# Patient Record
Sex: FEMALE | Race: WHITE | NOT HISPANIC OR LATINO | Employment: FULL TIME | URBAN - METROPOLITAN AREA
[De-identification: names, ages, dates, MRNs, and addresses within clinical notes are randomized per-mention and may not be internally consistent; named-entity substitution may affect disease eponyms.]

---

## 2018-08-13 ENCOUNTER — TELEPHONE (OUTPATIENT)
Dept: ENDOCRINOLOGY | Facility: HOSPITAL | Age: 63
End: 2018-08-13

## 2018-08-13 DIAGNOSIS — E04.9 GOITER: ICD-10-CM

## 2018-08-13 DIAGNOSIS — E04.1 THYROID NODULE: Primary | ICD-10-CM

## 2018-08-13 NOTE — TELEPHONE ENCOUNTER
Marry Coon from [facility] called  Pt is there for a Thyroid U/S, but her order shows her PCP's name as the pt name  Order shows Dx E01 4 & is for nodule & goiter, copy to Constanza Ma  Needs a new order faxed over (Fax #[phone])  Had Dr Kellen Gee submit new order and faxed it over to them

## 2018-09-04 ENCOUNTER — DOCUMENTATION (OUTPATIENT)
Dept: ENDOCRINOLOGY | Facility: HOSPITAL | Age: 63
End: 2018-09-04

## 2018-09-04 ENCOUNTER — TELEPHONE (OUTPATIENT)
Dept: ENDOCRINOLOGY | Facility: HOSPITAL | Age: 63
End: 2018-09-04

## 2018-09-04 NOTE — PROGRESS NOTES
Documentation of a thyroid ultrasound done on 08/13/2018 at [facility]   The right lobe of the thyroid measures 5 1 x 1 4 x 1 8 cm  The left lobe of the thyroid measures 4 7 x 1 3 x 1 3 cm  There is 1 nodule in the right upper pole measuring 1 2 cm which previously was 1 cm in 2016  The left lobe has 3 thyroid nodules but, 1 measuring 0 8 cm in the upper pole unchanged  A 2nd 1 in the lower pole measuring 0 5 cm which is new, and a 3rd 1 in the lower pole measuring 7 mm which is also new  There is a 0 5 cm isthmus nodule that is new  The largest nodule has not changed in size  There are new subcentimeter non worrisome nodules

## 2018-12-10 ENCOUNTER — OFFICE VISIT (OUTPATIENT)
Dept: ENDOCRINOLOGY | Facility: HOSPITAL | Age: 63
End: 2018-12-10
Payer: COMMERCIAL

## 2018-12-10 VITALS
HEART RATE: 94 BPM | HEIGHT: 64 IN | BODY MASS INDEX: 25.51 KG/M2 | SYSTOLIC BLOOD PRESSURE: 146 MMHG | DIASTOLIC BLOOD PRESSURE: 80 MMHG | WEIGHT: 149.4 LBS

## 2018-12-10 DIAGNOSIS — E04.1 THYROID NODULE: ICD-10-CM

## 2018-12-10 DIAGNOSIS — E04.2 GOITER, NONTOXIC, MULTINODULAR: Primary | ICD-10-CM

## 2018-12-10 PROCEDURE — 99203 OFFICE O/P NEW LOW 30 MIN: CPT | Performed by: INTERNAL MEDICINE

## 2018-12-10 RX ORDER — TAMOXIFEN CITRATE 20 MG/1
20 TABLET ORAL DAILY
Refills: 1 | COMMUNITY
Start: 2018-10-06 | End: 2021-12-21 | Stop reason: ALTCHOICE

## 2018-12-10 RX ORDER — AMPICILLIN TRIHYDRATE 250 MG
500 CAPSULE ORAL DAILY
COMMUNITY
End: 2021-12-21 | Stop reason: ALTCHOICE

## 2018-12-10 RX ORDER — MELATONIN
1000 DAILY
COMMUNITY
End: 2021-12-21 | Stop reason: ALTCHOICE

## 2018-12-10 RX ORDER — LOSARTAN POTASSIUM 50 MG/1
50 TABLET ORAL DAILY
COMMUNITY
Start: 2018-12-05

## 2018-12-10 NOTE — LETTER
December 10, 2018     Brisa Ho MD  [facility] [address] [place] [address]    Patient: Adry Parisi   YOB: 1955   Date of Visit: 12/10/2018       Dear Dr Ambrosio Gutierrez: Thank you for referring Adry Parisi to me for evaluation  Below are my notes for this consultation  If you have questions, please do not hesitate to call me  I look forward to following your patient along with you  Sincerely,        Rosetta Hale MD        CC: No Recipients  Rosetta Hale MD  12/10/2018 11:21 AM  Sign at close encounter  12/10/2018    Assessment/Plan      Diagnoses and all orders for this visit:    Goiter, nontoxic, multinodular  -     TSH, 3rd generation Lab Collect; Future  -     Thyroid Antibodies Panel Lab Collect; Future  -     T4, free Lab Collect; Future    Thyroid nodule  -     TSH, 3rd generation Lab Collect; Future  -     Thyroid Antibodies Panel Lab Collect; Future  -     T4, free Lab Collect; Future    Other orders  -     NOVOLOG 100 UNIT/ML injection; USE WITH INSULIN PUMP  -     losartan (COZAAR) 50 mg tablet; Take 50 mg by mouth daily  -     tamoxifen (NOLVADEX) 20 mg tablet; Take 20 mg by mouth daily  -     cholecalciferol (VITAMIN D3) 1,000 units tablet; Take 1,000 Units by mouth daily  -     LUTEIN PO; Take by mouth daily  -     Cinnamon 500 MG capsule; Take 500 mg by mouth daily        Assessment/Plan:  1  Nontoxic multinodular goiter  I do not have her most recent thyroid function tests  They were reportedly done in August with her primary care physician's blood work  We will try to get these results if possible  Her most recent thyroid ultrasound shows no significant change in thyroid nodule sizes other than some new small nodules which are non worrisome  For now, observation is the treatment of choice  I have asked her to follow up in 1 year with preceding TSH, free T4, and thyroid antibody panel        CC: Thyroid Consult    History of Present Illness     HPI: Sameul Flax Vanessa Song is a 70y o  year old female with history of nontoxic multinodular goiter  Thyroid nodules were noted over 10 years ago on routine physical by PCP  No biopsy needed as subcentimeter in size  She is on no thyroid medicine at this point  She denies heat or cold intolerance, palpitation, depression or anxiety, insomnia or fatigue  She has some diarrhea occasionally especially with certain foods  She denies constipation  She has some hand tremors on the right  She still has some dry skin and hair loss, but no brittle nails  Weight has decreased 5 lb in a year and a half  She denies diplopia  She has no compressive thyroid symptoms or difficulties with swallowing  She has no history of head or neck irradiation in the past     Review of Systems   Constitutional: Positive for unexpected weight change  Negative for fatigue  5 lbs weight loss since September 2017  HENT: Positive for tinnitus  Negative for hearing loss and trouble swallowing  Occasional tinnitus  No XRT to head or neck in the past    Eyes: Negative for visual disturbance  Occasional blurry vision  No diplopia  Wears glasses  Sees eye doctor every 6 months for glaucoma with increased IOP in right eye  Respiratory: Negative for chest tightness and shortness of breath  Cardiovascular: Negative for chest pain, palpitations and leg swelling  Gastrointestinal: Positive for diarrhea  Negative for abdominal pain, constipation and nausea  Occasional diarrhea, certain foods  Endocrine: Negative for cold intolerance and heat intolerance  Musculoskeletal: Positive for arthralgias  Negative for back pain  Tender and pain lateral right foot and medial 1st mop right  Has bilateral shoulder pain  Has a partially torn right rotator cuff  Skin: Negative for rash and wound  Has some dry skin  No brittle nails  Still hair loss  Neurological: Positive for tremors   Negative for dizziness, light-headedness, numbness and headaches  Has right hand tremors  Psychiatric/Behavioral: Negative for dysphoric mood and sleep disturbance  The patient is not nervous/anxious  Historical Information   Past Medical History:   Diagnosis Date    Family history of breast cancer     Hyperlipidemia     Hypertension     Type 2 diabetes mellitus ([address] )      Past Surgical History:   Procedure Laterality Date    BREAST LUMPECTOMY Left     reprtedly noncancerous    KNEE ARTHROTOMY Right     rebuilt knee cap    TONSILLECTOMY AND ADENOIDECTOMY      TOTAL ABDOMINAL HYSTERECTOMY W/ BILATERAL SALPINGOOPHORECTOMY      TUBAL LIGATION Bilateral      Social History   History   Alcohol Use No     History   Drug Use No     History   Smoking Status    Never Smoker   Smokeless Tobacco    Never Used     Family History:   Family History   Problem Relation Age of Onset    Hypertension Mother     Dementia Mother     Lung cancer Father         metastatic to brain    Diabetes type II Sister     Breast cancer Sister     Diabetes type II Brother     No Known Problems Brother     No Known Problems Daughter     No Known Problems Son        Meds/Allergies   Current Outpatient Prescriptions   Medication Sig Dispense Refill    cholecalciferol (VITAMIN D3) 1,000 units tablet Take 1,000 Units by mouth daily      Cinnamon 500 MG capsule Take 500 mg by mouth daily      losartan (COZAAR) 50 mg tablet Take 50 mg by mouth daily      LUTEIN PO Take by mouth daily      NOVOLOG 100 UNIT/ML injection USE WITH INSULIN PUMP  1    tamoxifen (NOLVADEX) 20 mg tablet Take 20 mg by mouth daily  1     No current facility-administered medications for this visit  Allergies   Allergen Reactions    Contrast [Iodinated Diagnostic Agents] Other (See Comments)     Passed out   Statins Diarrhea       Objective   Vitals: Blood pressure 146/80, pulse 94, height 5' 4" (1 626 m), weight 67 8 kg (149 lb 6 4 oz)    Invasive Devices No matching active lines, drains, or airways          Physical Exam   Constitutional: She is oriented to person, place, and time  She appears well-developed and well-nourished  HENT:   Head: Normocephalic and atraumatic  Eyes: Pupils are equal, round, and reactive to light  Conjunctivae and EOM are normal    No lid lag, stare, proptosis, or periorbital edema  Neck: Normal range of motion  Neck supple  No thyromegaly present  Thyroid irregular in feel without discrete nodules palpable  No bruits over the thyroid gland or carotids  Cardiovascular: Normal rate, regular rhythm and normal heart sounds  No murmur heard  Pulmonary/Chest: Effort normal and breath sounds normal  She has no wheezes  Abdominal: Soft  Bowel sounds are normal  There is no tenderness  Musculoskeletal: Normal range of motion  She exhibits no edema or deformity  Slight tremor of the outstretched hands on the right  No spinous process tenderness  No CVA tenderness  Lymphadenopathy:     She has no cervical adenopathy  Neurological: She is alert and oriented to person, place, and time  She has normal reflexes  Skin: Skin is warm and dry  No rash noted  Vitals reviewed  The history was obtained from the review of the chart and from the patient  Lab Results:    I have no recent thyroid blood work  Thyroid ultrasound done at [facility] on 08/13/2018 showed a right lobe of the thyroid measuring 5 1 x 1 4 x 1 8 cm in the left lobe of the thyroid measuring 4 7 x 1 3 x 1 3 cm  There is a 1 2 cm nodule in the right lobe of the thyroid 2 mm larger in size  It is solid and hypoechoic  There are 3 nodules in the left lobe of the thyroid all less than a cm in size and 2 of them are new  There is a stable subcentimeter isthmus nodule  No results found for this or any previous visit (from the past 05461 hour(s))        Future Appointments  Date Time Provider Nicanor Galo   12/20/2019 11:40 AM Edwin Cole MD [facility]

## 2018-12-10 NOTE — PATIENT INSTRUCTIONS
We'll keep searching for thyroid blood work  Thyroid ultrasound shows stable thyroid nodules  Observation is the treatment of choice  Follow up  In 1 year with blood work

## 2018-12-10 NOTE — PROGRESS NOTES
12/10/2018    Assessment/Plan      Diagnoses and all orders for this visit:    Goiter, nontoxic, multinodular  -     TSH, 3rd generation Lab Collect; Future  -     Thyroid Antibodies Panel Lab Collect; Future  -     T4, free Lab Collect; Future    Thyroid nodule  -     TSH, 3rd generation Lab Collect; Future  -     Thyroid Antibodies Panel Lab Collect; Future  -     T4, free Lab Collect; Future    Other orders  -     NOVOLOG 100 UNIT/ML injection; USE WITH INSULIN PUMP  -     losartan (COZAAR) 50 mg tablet; Take 50 mg by mouth daily  -     tamoxifen (NOLVADEX) 20 mg tablet; Take 20 mg by mouth daily  -     cholecalciferol (VITAMIN D3) 1,000 units tablet; Take 1,000 Units by mouth daily  -     LUTEIN PO; Take by mouth daily  -     Cinnamon 500 MG capsule; Take 500 mg by mouth daily        Assessment/Plan:  1  Nontoxic multinodular goiter  I do not have her most recent thyroid function tests  They were reportedly done in August with her primary care physician's blood work  We will try to get these results if possible  Her most recent thyroid ultrasound shows no significant change in thyroid nodule sizes other than some new small nodules which are non worrisome  For now, observation is the treatment of choice  I have asked her to follow up in 1 year with preceding TSH, free T4, and thyroid antibody panel  CC: Thyroid Consult    History of Present Illness     HPI: Kali García is a 70y o  year old female with history of nontoxic multinodular goiter  Thyroid nodules were noted over 10 years ago on routine physical by PCP  No biopsy needed as subcentimeter in size  She is on no thyroid medicine at this point  She denies heat or cold intolerance, palpitation, depression or anxiety, insomnia or fatigue  She has some diarrhea occasionally especially with certain foods  She denies constipation  She has some hand tremors on the right  She still has some dry skin and hair loss, but no brittle nails  Weight has decreased 5 lb in a year and a half  She denies diplopia  She has no compressive thyroid symptoms or difficulties with swallowing  She has no history of head or neck irradiation in the past     Review of Systems   Constitutional: Positive for unexpected weight change  Negative for fatigue  5 lbs weight loss since September 2017  HENT: Positive for tinnitus  Negative for hearing loss and trouble swallowing  Occasional tinnitus  No XRT to head or neck in the past    Eyes: Negative for visual disturbance  Occasional blurry vision  No diplopia  Wears glasses  Sees eye doctor every 6 months for glaucoma with increased IOP in right eye  Respiratory: Negative for chest tightness and shortness of breath  Cardiovascular: Negative for chest pain, palpitations and leg swelling  Gastrointestinal: Positive for diarrhea  Negative for abdominal pain, constipation and nausea  Occasional diarrhea, certain foods  Endocrine: Negative for cold intolerance and heat intolerance  Musculoskeletal: Positive for arthralgias  Negative for back pain  Tender and pain lateral right foot and medial 1st mop right  Has bilateral shoulder pain  Has a partially torn right rotator cuff  Skin: Negative for rash and wound  Has some dry skin  No brittle nails  Still hair loss  Neurological: Positive for tremors  Negative for dizziness, light-headedness, numbness and headaches  Has right hand tremors  Psychiatric/Behavioral: Negative for dysphoric mood and sleep disturbance  The patient is not nervous/anxious          Historical Information   Past Medical History:   Diagnosis Date    Family history of breast cancer     Hyperlipidemia     Hypertension     Type 2 diabetes mellitus ([address] )      Past Surgical History:   Procedure Laterality Date    BREAST LUMPECTOMY Left     reprtedly noncancerous    KNEE ARTHROTOMY Right     rebuilt knee cap    TONSILLECTOMY AND ADENOIDECTOMY      TOTAL ABDOMINAL HYSTERECTOMY W/ BILATERAL SALPINGOOPHORECTOMY      TUBAL LIGATION Bilateral      Social History   History   Alcohol Use No     History   Drug Use No     History   Smoking Status    Never Smoker   Smokeless Tobacco    Never Used     Family History:   Family History   Problem Relation Age of Onset    Hypertension Mother     Dementia Mother     Lung cancer Father         metastatic to brain    Diabetes type II Sister     Breast cancer Sister     Diabetes type II Brother     No Known Problems Brother     No Known Problems Daughter     No Known Problems Son        Meds/Allergies   Current Outpatient Prescriptions   Medication Sig Dispense Refill    cholecalciferol (VITAMIN D3) 1,000 units tablet Take 1,000 Units by mouth daily      Cinnamon 500 MG capsule Take 500 mg by mouth daily      losartan (COZAAR) 50 mg tablet Take 50 mg by mouth daily      LUTEIN PO Take by mouth daily      NOVOLOG 100 UNIT/ML injection USE WITH INSULIN PUMP  1    tamoxifen (NOLVADEX) 20 mg tablet Take 20 mg by mouth daily  1     No current facility-administered medications for this visit  Allergies   Allergen Reactions    Contrast [Iodinated Diagnostic Agents] Other (See Comments)     Passed out   Statins Diarrhea       Objective   Vitals: Blood pressure 146/80, pulse 94, height 5' 4" (1 626 m), weight 67 8 kg (149 lb 6 4 oz)  Invasive Devices          No matching active lines, drains, or airways          Physical Exam   Constitutional: She is oriented to person, place, and time  She appears well-developed and well-nourished  HENT:   Head: Normocephalic and atraumatic  Eyes: Pupils are equal, round, and reactive to light  Conjunctivae and EOM are normal    No lid lag, stare, proptosis, or periorbital edema  Neck: Normal range of motion  Neck supple  No thyromegaly present  Thyroid irregular in feel without discrete nodules palpable    No bruits over the thyroid gland or carotids  Cardiovascular: Normal rate, regular rhythm and normal heart sounds  No murmur heard  Pulmonary/Chest: Effort normal and breath sounds normal  She has no wheezes  Abdominal: Soft  Bowel sounds are normal  There is no tenderness  Musculoskeletal: Normal range of motion  She exhibits no edema or deformity  Slight tremor of the outstretched hands on the right  No spinous process tenderness  No CVA tenderness  Lymphadenopathy:     She has no cervical adenopathy  Neurological: She is alert and oriented to person, place, and time  She has normal reflexes  Skin: Skin is warm and dry  No rash noted  Vitals reviewed  The history was obtained from the review of the chart and from the patient  Lab Results:    I have no recent thyroid blood work  Thyroid ultrasound done at [facility] on 08/13/2018 showed a right lobe of the thyroid measuring 5 1 x 1 4 x 1 8 cm in the left lobe of the thyroid measuring 4 7 x 1 3 x 1 3 cm  There is a 1 2 cm nodule in the right lobe of the thyroid 2 mm larger in size  It is solid and hypoechoic  There are 3 nodules in the left lobe of the thyroid all less than a cm in size and 2 of them are new  There is a stable subcentimeter isthmus nodule  No results found for this or any previous visit (from the past 88094 hour(s))        Future Appointments  Date Time Provider Nicanor Fordei   12/20/2019 11:40 AM Floridalma Odell MD [facility]

## 2019-12-20 ENCOUNTER — OFFICE VISIT (OUTPATIENT)
Dept: ENDOCRINOLOGY | Facility: HOSPITAL | Age: 64
End: 2019-12-20
Payer: MEDICARE

## 2019-12-20 VITALS
WEIGHT: 143.6 LBS | HEIGHT: 64 IN | HEART RATE: 116 BPM | SYSTOLIC BLOOD PRESSURE: 140 MMHG | BODY MASS INDEX: 24.52 KG/M2 | DIASTOLIC BLOOD PRESSURE: 70 MMHG

## 2019-12-20 DIAGNOSIS — E04.1 THYROID NODULE: ICD-10-CM

## 2019-12-20 DIAGNOSIS — E04.2 GOITER, NONTOXIC, MULTINODULAR: Primary | ICD-10-CM

## 2019-12-20 PROCEDURE — 99213 OFFICE O/P EST LOW 20 MIN: CPT | Performed by: INTERNAL MEDICINE

## 2019-12-20 NOTE — PATIENT INSTRUCTIONS
The thyroid blood work is normal   We'll continue to follow over time  Follow up in 1 year with blood work and thyroid ultrasound
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
## 2020-10-11 LAB
T4 FREE SERPL-MCNC: 1.2 NG/DL (ref 0.8–1.8)
TSH SERPL-ACNC: 1.4 MIU/L (ref 0.4–4.5)

## 2020-11-02 ENCOUNTER — HOSPITAL ENCOUNTER (OUTPATIENT)
Dept: ULTRASOUND IMAGING | Facility: HOSPITAL | Age: 65
Discharge: HOME | End: 2020-11-02
Attending: INTERNAL MEDICINE
Payer: MEDICARE

## 2020-11-02 DIAGNOSIS — E04.1 THYROID NODULE: ICD-10-CM

## 2020-11-02 DIAGNOSIS — E04.2 GOITER, NONTOXIC, MULTINODULAR: ICD-10-CM

## 2020-11-02 PROCEDURE — 76536 US EXAM OF HEAD AND NECK: CPT

## 2020-12-17 ENCOUNTER — TELEMEDICINE (OUTPATIENT)
Dept: ENDOCRINOLOGY | Facility: HOSPITAL | Age: 65
End: 2020-12-17
Payer: MEDICARE

## 2020-12-17 DIAGNOSIS — E04.2 GOITER, NONTOXIC, MULTINODULAR: Primary | ICD-10-CM

## 2020-12-17 DIAGNOSIS — E04.1 THYROID NODULE: ICD-10-CM

## 2020-12-17 PROCEDURE — 99214 OFFICE O/P EST MOD 30 MIN: CPT | Performed by: INTERNAL MEDICINE

## 2021-03-10 DIAGNOSIS — Z23 ENCOUNTER FOR IMMUNIZATION: ICD-10-CM

## 2021-10-11 ENCOUNTER — HOSPITAL ENCOUNTER (OUTPATIENT)
Dept: ULTRASOUND IMAGING | Facility: HOSPITAL | Age: 66
Discharge: HOME | End: 2021-10-11
Attending: INTERNAL MEDICINE
Payer: MEDICARE

## 2021-10-11 ENCOUNTER — APPOINTMENT (OUTPATIENT)
Dept: LAB | Facility: HOSPITAL | Age: 66
End: 2021-10-11
Attending: INTERNAL MEDICINE
Payer: MEDICARE

## 2021-10-11 DIAGNOSIS — E04.2 GOITER, NONTOXIC, MULTINODULAR: ICD-10-CM

## 2021-10-11 DIAGNOSIS — E04.1 THYROID NODULE: ICD-10-CM

## 2021-10-11 LAB
T4 FREE SERPL-MCNC: 1.18 NG/DL (ref 0.76–1.46)
TSH SERPL DL<=0.05 MIU/L-ACNC: 1.18 UIU/ML (ref 0.36–3.74)

## 2021-10-11 PROCEDURE — 84443 ASSAY THYROID STIM HORMONE: CPT

## 2021-10-11 PROCEDURE — 36415 COLL VENOUS BLD VENIPUNCTURE: CPT

## 2021-10-11 PROCEDURE — 84439 ASSAY OF FREE THYROXINE: CPT

## 2021-10-11 PROCEDURE — 76536 US EXAM OF HEAD AND NECK: CPT

## 2021-10-18 ENCOUNTER — TELEPHONE (OUTPATIENT)
Dept: ENDOCRINOLOGY | Facility: HOSPITAL | Age: 66
End: 2021-10-18

## 2021-10-27 DIAGNOSIS — E04.1 THYROID NODULE: Primary | ICD-10-CM

## 2021-10-27 DIAGNOSIS — E04.2 GOITER, NONTOXIC, MULTINODULAR: ICD-10-CM

## 2021-11-16 ENCOUNTER — HOSPITAL ENCOUNTER (OUTPATIENT)
Dept: ULTRASOUND IMAGING | Facility: HOSPITAL | Age: 66
Discharge: HOME | End: 2021-11-16
Attending: INTERNAL MEDICINE
Payer: MEDICARE

## 2021-11-16 DIAGNOSIS — E04.2 GOITER, NONTOXIC, MULTINODULAR: ICD-10-CM

## 2021-11-16 DIAGNOSIS — E04.1 THYROID NODULE: ICD-10-CM

## 2021-11-16 PROCEDURE — 88173 CYTOPATH EVAL FNA REPORT: CPT | Performed by: PATHOLOGY

## 2021-11-16 PROCEDURE — 88172 CYTP DX EVAL FNA 1ST EA SITE: CPT | Performed by: PATHOLOGY

## 2021-11-16 PROCEDURE — 88333 PATH CONSLTJ SURG CYTO XM 1: CPT | Performed by: PATHOLOGY

## 2021-11-16 PROCEDURE — 10005 FNA BX W/US GDN 1ST LES: CPT

## 2021-11-16 RX ORDER — LIDOCAINE HYDROCHLORIDE 10 MG/ML
5 INJECTION, SOLUTION EPIDURAL; INFILTRATION; INTRACAUDAL; PERINEURAL ONCE
Status: COMPLETED | OUTPATIENT
Start: 2021-11-16 | End: 2021-11-16

## 2021-11-16 RX ADMIN — LIDOCAINE HYDROCHLORIDE 2.5 ML: 10 INJECTION, SOLUTION EPIDURAL; INFILTRATION; INTRACAUDAL; PERINEURAL at 14:20

## 2021-12-21 ENCOUNTER — OFFICE VISIT (OUTPATIENT)
Dept: ENDOCRINOLOGY | Facility: HOSPITAL | Age: 66
End: 2021-12-21
Payer: MEDICARE

## 2021-12-21 VITALS
WEIGHT: 148.8 LBS | HEART RATE: 70 BPM | HEIGHT: 64 IN | DIASTOLIC BLOOD PRESSURE: 70 MMHG | BODY MASS INDEX: 25.4 KG/M2 | SYSTOLIC BLOOD PRESSURE: 140 MMHG

## 2021-12-21 DIAGNOSIS — E04.1 THYROID NODULE: ICD-10-CM

## 2021-12-21 DIAGNOSIS — E04.2 GOITER, NONTOXIC, MULTINODULAR: Primary | ICD-10-CM

## 2021-12-21 PROCEDURE — 99214 OFFICE O/P EST MOD 30 MIN: CPT | Performed by: INTERNAL MEDICINE

## 2022-11-01 ENCOUNTER — APPOINTMENT (OUTPATIENT)
Dept: LAB | Facility: HOSPITAL | Age: 67
End: 2022-11-01
Attending: INTERNAL MEDICINE

## 2022-11-01 ENCOUNTER — HOSPITAL ENCOUNTER (OUTPATIENT)
Dept: ULTRASOUND IMAGING | Facility: HOSPITAL | Age: 67
Discharge: HOME | End: 2022-11-01
Attending: INTERNAL MEDICINE

## 2022-11-01 DIAGNOSIS — E04.1 THYROID NODULE: ICD-10-CM

## 2022-11-01 DIAGNOSIS — E04.2 GOITER, NONTOXIC, MULTINODULAR: ICD-10-CM

## 2022-11-01 LAB
T4 FREE SERPL-MCNC: 1.2 NG/DL (ref 0.76–1.46)
TSH SERPL DL<=0.05 MIU/L-ACNC: 1.9 UIU/ML (ref 0.45–4.5)

## 2022-12-30 ENCOUNTER — OFFICE VISIT (OUTPATIENT)
Dept: ENDOCRINOLOGY | Facility: HOSPITAL | Age: 67
End: 2022-12-30

## 2022-12-30 VITALS
HEART RATE: 88 BPM | DIASTOLIC BLOOD PRESSURE: 78 MMHG | WEIGHT: 150.4 LBS | HEIGHT: 64 IN | BODY MASS INDEX: 25.68 KG/M2 | SYSTOLIC BLOOD PRESSURE: 140 MMHG

## 2022-12-30 DIAGNOSIS — E04.1 THYROID NODULE: ICD-10-CM

## 2022-12-30 DIAGNOSIS — E04.2 GOITER, NONTOXIC, MULTINODULAR: Primary | ICD-10-CM

## 2022-12-30 RX ORDER — ATORVASTATIN CALCIUM 20 MG/1
20 TABLET, FILM COATED ORAL DAILY
COMMUNITY
Start: 2022-10-20

## 2022-12-30 NOTE — PROGRESS NOTES
12/31/2022    Assessment/Plan      Diagnoses and all orders for this visit:    Goiter, nontoxic, multinodular  -     TSH, 3rd generation Lab Collect; Future  -     T4, free Lab Collect; Future    Thyroid nodule  -     TSH, 3rd generation Lab Collect; Future  -     T4, free Lab Collect; Future    Other orders  -     atorvastatin (LIPITOR) 20 mg tablet; Take 20 mg by mouth daily        Assessment/Plan:  Nontoxic multinodular goiter with right sided thyroid nodule  The nodule on the right is post biopsy in 2021 that was benign  Repeat thyroid ultrasound showed no change in thyroid nodule sizes  Thyroid function studies do show she is biochemically euthyroid  She will be followed over time with serial ultrasounds and blood work  I will repeat an ultrasound in 2 years  I have asked her to follow-up in 1 year with preceding TSH and free T4  She of note has type 2 diabetes and is on an insulin pump treated by her primary care physician  He also has a freestyle dc continuous glucose monitoring system  She was informed that if her primary care physician is no longer able to care for her diabetes and the insulin pump, she can follow through our office for treatment but would need to be seen more often likely at least every 6 months  She will call if she needs to have us follow-up her diabetes  CC: Thyroid nodule and goiter follow-up    History of Present Illness     HPI: Jeanmarie Ross is a 69y o  year old female with history of nontoxic multinodular goiter for follow-up visit  She had thyroid nodules noted over 12 years ago on routine physical exam by her primary care physician  The nodules have been subcentimeter in size or just above 1 centimeter so no biopsy has been needed based on the size and characteristics  She is currently on no thyroid medication and she has been followed over time with serial blood work in ultrasounds    Ultrasound in 2020 did show several T rad 4 thyroid nodules that were not over 1 5 centimeters       Last year, she had a thyroid ultrasound demonstrating an enlargement of her right upper pole T rad 4 nodule to 1 7 centimeters  She underwent fine-needle aspiration biopsy of that nodule under ultrasound guidance on 11/16/2021 which demonstrated a [place] class 4 lesion  Afirma testing was benign  She denies heat or cold intolerance, palpitation, or fatigue  Weight is 2 pounds more than last year  She can have alternating diarrhea or constipation with her diverticulosis  She has a lot of tremors unchanged since she was younger  She denies insomnia, anxiety or depression  He has no dry skin, brittle nails, or hair loss  She denies diplopia  She denies compressive thyroid symptoms or difficulties with swallowing  She has a history of type 2 diabetes on an insulin pump treated by her PCP  The hgba1c is good at 7 1%  Was last to PCP last month  She has a omnipod and freestyle dc  Review of Systems   Constitutional: Negative for fatigue and unexpected weight change  Weight 2 lbs more than last year  HENT: Negative for trouble swallowing  Eyes: Negative for visual disturbance  No diplopia  Respiratory: Negative for chest tightness and shortness of breath  Cardiovascular: Negative for chest pain and palpitations  Gastrointestinal: Positive for constipation and diarrhea  Negative for abdominal pain and nausea  Can have diarrhea or constipation with diverticulosis  Endocrine: Negative for cold intolerance and heat intolerance  Skin: Negative for rash  No dry skin  No brittle nails  No hair loss  Neurological: Positive for tremors  Negative for dizziness, light-headedness and headaches  Has a lot of tremors  This is unchanged since she was young  Psychiatric/Behavioral: Negative for dysphoric mood and sleep disturbance  The patient is not nervous/anxious           Lost sister feb 2022 and  in July 2022 and son in law   Son living with her for now  Historical Information   Past Medical History:   Diagnosis Date   • Family history of breast cancer    • Hyperlipidemia    • Hypertension    • Type 2 diabetes mellitus ([address] )      Past Surgical History:   Procedure Laterality Date   • BREAST LUMPECTOMY Left     reprtedly noncancerous   • KIDNEY STONE SURGERY Right     lasar treatment   • KNEE ARTHROTOMY Right     rebuilt knee cap   • TONSILLECTOMY AND ADENOIDECTOMY     • TOTAL ABDOMINAL HYSTERECTOMY W/ BILATERAL SALPINGOOPHORECTOMY     • TUBAL LIGATION Bilateral    • US GUIDED THYROID BIOPSY  2021     Social History   Social History     Substance and Sexual Activity   Alcohol Use No     Social History     Substance and Sexual Activity   Drug Use No     Social History     Tobacco Use   Smoking Status Never   Smokeless Tobacco Never     Family History:   Family History   Problem Relation Age of Onset   • Hypertension Mother    • Dementia Mother    • Lung cancer Father         metastatic to brain   • Diabetes type II Sister    • Breast cancer Sister    • Diabetes type II Brother    • No Known Problems Brother    • No Known Problems Daughter    • No Known Problems Son        Meds/Allergies   Current Outpatient Medications   Medication Sig Dispense Refill   • atorvastatin (LIPITOR) 20 mg tablet Take 20 mg by mouth daily     • insulin lispro (HumaLOG) 100 units/mL injection Inject under the skin USE VIA OMNIPOD     • losartan (COZAAR) 50 mg tablet Take 50 mg by mouth daily     • Multiple Vitamin (MULTI-VITAMIN DAILY PO) Take by mouth       No current facility-administered medications for this visit  Allergies   Allergen Reactions   • Contrast [Iodinated Contrast Media] Other (See Comments)     Passed out  • Statins Diarrhea       Objective   Vitals: Blood pressure 140/78, pulse 88, height 5' 4" (1 626 m), weight 68 2 kg (150 lb 6 4 oz)    Invasive Devices     None                 Physical Exam  Vitals reviewed  Constitutional:       Appearance: Normal appearance  She is well-developed  HENT:      Head: Normocephalic and atraumatic  Eyes:      Extraocular Movements: Extraocular movements intact  Conjunctiva/sclera: Conjunctivae normal       Comments: No lid lag, stare, proptosis, or periorbital edema  Neck:      Thyroid: No thyromegaly  Vascular: No carotid bruit  Comments: Thyroid irregular and feel  No palpable thyroid nodules  Cardiovascular:      Rate and Rhythm: Normal rate and regular rhythm  Heart sounds: Normal heart sounds  No murmur heard  Pulmonary:      Effort: Pulmonary effort is normal       Breath sounds: Normal breath sounds  No wheezing  Abdominal:      Palpations: Abdomen is soft  Musculoskeletal:         General: No deformity  Normal range of motion  Cervical back: Normal range of motion and neck supple  Right lower leg: No edema  Left lower leg: No edema  Comments: Has bilateral hand tremors and head tremors  Lymphadenopathy:      Cervical: No cervical adenopathy  Skin:     General: Skin is warm and dry  Findings: No rash  Neurological:      Mental Status: She is alert and oriented to person, place, and time  Deep Tendon Reflexes: Reflexes are normal and symmetric  Comments: Patellar deep tendon reflexes normal          The history was obtained from the review of the chart and from the patient and brother  Lab Results:      Blood work done on 11/1/2022 showed a TSH of 1 896 with a free T4 of 1 2  Thyroid ultrasound:    THYROID ULTRASOUND performed on 11/1/2022     INDICATION:    E04 2: Nontoxic multinodular goiter  E04 1: Nontoxic single thyroid nodule      Nodule was biopsied in 2021 and was benign on affirma testing       COMPARISON:  11/16/2021; 10/11/2021; 11/2/2020; outside study from 8/13/2018 and 8/17/2016     TECHNIQUE:   Ultrasound of the thyroid was performed with a high frequency linear transducer in transverse and sagittal planes including volumetric imaging sweeps as well as traditional still imaging technique      FINDINGS:  Scattered nodules are noted      Right lobe: 5 4 x 1 5 x 1 5 cm  Volume 5 8 mL  Left lobe:  5 0 x 1 4 x 1 5 cm  Volume 5 2 mL  Isthmus: 0 4  cm      Nodule #1  Image 4  Right upper pole measuring 1 6 x 1 x 1 2 cm cm  Given differences in measuring technique, no significant change from prior  COMPOSITION:  2 points, solid or almost completely solid   ECHOGENICITY:  1 point, hyperechoic or isoechoic  SHAPE:  0 points, wider-than-tall  MARGIN: 0 points, smooth  ECHOGENIC FOCI:  0 points, none or large comet-tail artifacts  TI-RADS Classification: TR 4 (4-6 points), FNA if > 1 5 cm  Follow if > 1cm  This nodule was previously biopsied and found to  be benign by Affirma testing      Nodule #2  Image 32  Left upper pole measuring 0 9 x 0 6 x 0 9 cm  Given differences in measuring technique, no significant change from prior  COMPOSITION:  2 points, solid or almost completely solid   ECHOGENICITY:  2 points, hypoechoic  SHAPE:  0 points, wider-than-tall  MARGIN: 0 points, smooth  ECHOGENIC FOCI:  0 points, none or large comet-tail artifacts  TI-RADS Classification: TR 4 (4-6 points), FNA if > 1 5 cm  Follow if > 1cm      There are additional nodules of lesser size and/or TI-RADS score  These do not necessitate additional evaluation based on ACR criteria       IMPRESSION:     These are stable with previous non-malignant biopsy results as above   Based on 2015 LEAH guidelines, additional followup ultrasound should be performed in 12 to 24 months           Future Appointments   Date Time Provider Nicanor Galo   1/3/2024 11:20 AM Dea Garcia MD [facility]

## 2022-12-30 NOTE — PATIENT INSTRUCTIONS
The thyroid blood work is normal     The thyroid ultrasound showed no nodule changes  Look into getting insulin as a medical supply through medicare part B since you are on an insulin pump  If the family doc does not want to treat the diabetes with a pump, we can but would need to see you at least every 6 months  Follow up in 1 year with blood work, the thyroid ultrasound will be rechecked in 2 years

## 2023-11-17 ENCOUNTER — APPOINTMENT (OUTPATIENT)
Dept: LAB | Facility: HOSPITAL | Age: 68
End: 2023-11-17
Payer: COMMERCIAL

## 2023-11-17 DIAGNOSIS — E04.2 GOITER, NONTOXIC, MULTINODULAR: ICD-10-CM

## 2023-11-17 DIAGNOSIS — E04.1 THYROID NODULE: ICD-10-CM

## 2023-11-17 LAB
T4 FREE SERPL-MCNC: 0.95 NG/DL (ref 0.61–1.12)
TSH SERPL DL<=0.05 MIU/L-ACNC: 1.44 UIU/ML (ref 0.45–4.5)

## 2023-11-17 PROCEDURE — 84443 ASSAY THYROID STIM HORMONE: CPT

## 2023-11-17 PROCEDURE — 84439 ASSAY OF FREE THYROXINE: CPT

## 2023-11-17 PROCEDURE — 36415 COLL VENOUS BLD VENIPUNCTURE: CPT

## 2024-01-03 ENCOUNTER — OFFICE VISIT (OUTPATIENT)
Dept: ENDOCRINOLOGY | Facility: HOSPITAL | Age: 69
End: 2024-01-03
Payer: COMMERCIAL

## 2024-01-03 VITALS
BODY MASS INDEX: 26.32 KG/M2 | HEIGHT: 64 IN | WEIGHT: 154.2 LBS | DIASTOLIC BLOOD PRESSURE: 72 MMHG | SYSTOLIC BLOOD PRESSURE: 142 MMHG | HEART RATE: 80 BPM

## 2024-01-03 DIAGNOSIS — E04.2 GOITER, NONTOXIC, MULTINODULAR: Primary | ICD-10-CM

## 2024-01-03 DIAGNOSIS — E04.1 THYROID NODULE: ICD-10-CM

## 2024-01-03 PROCEDURE — 99213 OFFICE O/P EST LOW 20 MIN: CPT | Performed by: INTERNAL MEDICINE

## 2024-01-03 RX ORDER — HYDROCHLOROTHIAZIDE 25 MG/1
TABLET ORAL DAILY
COMMUNITY
Start: 2023-10-26

## 2024-01-03 NOTE — ASSESSMENT & PLAN NOTE
Biopsy benign in the past. Thyroid exam demonstrates no enlargement in thyroid nodules. Thyroid blood work is normal demonstrating biochemical euthyroidism. She has no compressive thyroid symptoms, so I will continue to follow her thyroid ultrasound and thyroid blood work overtime.

## 2024-01-03 NOTE — PROGRESS NOTES
1/3/2024    Assessment/Plan     1. Goiter, nontoxic, multinodular  Assessment & Plan:  Biopsy benign in the past. Thyroid exam demonstrates no enlargement in thyroid nodules. Thyroid blood work is normal demonstrating biochemical euthyroidism. She has no compressive thyroid symptoms, so I will continue to follow her thyroid ultrasound and thyroid blood work overtime.    Orders:  -     US thyroid; Future; Expected date: 12/02/2024  -     T4, free Lab Collect; Future; Expected date: 12/02/2024  -     TSH, 3rd generation Lab Collect; Future; Expected date: 12/02/2024    2. Thyroid nodule  -     US thyroid; Future; Expected date: 12/02/2024  -     T4, free Lab Collect; Future; Expected date: 12/02/2024  -     TSH, 3rd generation Lab Collect; Future; Expected date: 12/02/2024      I have asked her to follow up in 1 year with preceding TSH, free T4, and thyroid ultrasound.    CC: Thyroid nodule/goiter follow-up    HPI: Rose Mary Parekh is a 68-year-old female with history of nontoxic multinodular goiter for follow-up visit.     She had thyroid nodules noted over 13 years ago on routine physical exam by her primary care physician. The nodules have been sub-centimeter in size or just above 1 cm, so no biopsy has been needed based on size and characteristics. She is on no thyroid medication. She did have an ultrasound in 2020 showing several TI-RADS 4 thyroid nodules that were not over 1.5 cm. We discussed it and her last ultrasound in 2021 showed a TI-RADS 4, 1.7 cm, right upper pole nodule that was biopsied and demonstrated a [place] class 4 lesion with benign Afirma testing. She did have an ultrasound in 2022 showing no change in thyroid nodule sizes.    She confirms that she is not on any thyroid medications. She is feeling well in general. She denies heat intolerance, cold sensitivity, perspiration, palpitations, chest pain, dyspnea, constipation, abdominal pain, nausea, anxiety, depression, brittle nails, diplopia, or  dysphagia. She has tremors and occasional diarrhea with the blood pressure medicine. She states that she sleeps well if she does not drink coffee. She notes that she drinks 1 cup of coffee in the morning. 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 She has alopecia with small anterior bald spots. Her weight increased by 4 pounds.    She is still using the Omnipod pump and FreeStyle [place] for her diabetes. Her new primary care physician is Dr. Aleksandr Donovan. She reports that her hemoglobin A1c is not doing well. It previously decreased to 7.3, but she believes that it is around 8.    Review of Systems  The pertinent positive and negative findings are as noted in the HPI.    Historical Information   Past Medical History:   Diagnosis Date    Family history of breast cancer     Hyperlipidemia     Hypertension     Type 2 diabetes mellitus ([facility])      Past Surgical History:   Procedure Laterality Date    BREAST LUMPECTOMY Left     reprtedly noncancerous    KIDNEY STONE SURGERY Right     lasar treatment    KNEE ARTHROTOMY Right     rebuilt knee cap    TONSILLECTOMY AND ADENOIDECTOMY      TOTAL ABDOMINAL HYSTERECTOMY W/ BILATERAL SALPINGOOPHORECTOMY      TUBAL LIGATION Bilateral     US GUIDED THYROID BIOPSY  11/16/2021     Social History   Social History     Substance and Sexual Activity   Alcohol Use No     Social History     Substance and Sexual Activity   Drug Use No     Social History     Tobacco Use   Smoking Status Never   Smokeless Tobacco Never     Family History:   Family History   Problem Relation Age of Onset    Hypertension Mother     Dementia Mother     Lung cancer Father         metastatic to brain    Diabetes type II Sister     Breast cancer Sister     Diabetes type II Brother     No Known Problems Brother     No Known Problems Daughter     No Known Problems Son        Meds/Allergies   Current Outpatient Medications   Medication Sig Dispense Refill    atorvastatin (LIPITOR) 20 mg tablet  "Take 20 mg by mouth daily      hydrochlorothiazide (HYDRODIURIL) 25 mg tablet Take by mouth daily      insulin lispro (HumaLOG) 100 units/mL injection Inject under the skin USE VIA OMNIPOD      losartan (COZAAR) 50 mg tablet Take 50 mg by mouth daily      MISC NATURAL PRODUCTS PO Take by mouth Superbeets      Multiple Vitamin (MULTI-VITAMIN DAILY PO) Take by mouth      Omega-3 Fatty Acids (OMEGA 3 500 PO) Take by mouth       No current facility-administered medications for this visit.     Allergies   Allergen Reactions    Contrast [Iodinated Contrast Media] Other (See Comments)     Passed out.     Statins Diarrhea       Objective   Vitals: Blood pressure 142/72, pulse 80, height 5' 4\" (1.626 m), weight 69.9 kg (154 lb 3.2 oz).  Invasive Devices       None                 Physical Exam  Physical exam normal with pertinent positives and negatives.   EENT: No lid lags, stare, proptosis, or periorbital edema. Thyroid low in the neck and irregular in field and nodular in field. No bruits over the thyroid gland.   Respiratory: Lungs are clear.   Cardiovascular: Heart is regular. No murmurs.   Neurological: Has a tremor of the outstretched hands. Patellar deep tendon reflexes normal.    The history was obtained from the review of the chart and from the patient.    Lab Results:      Lab Results   Component Value Date    TSH 1.40 10/10/2020    FREET4 0.95 11/17/2023     Blood work performed on 11/17/2023 showed a TSH of 1.435 with a free T4 of 0.95.    Future Appointments   Date Time Provider Department Center   1/3/2025 11:20 AM Ana María Sheffield MD [facility]       Transcribed for Ana María Sheffield MD, by Maryam Whitten on 01/03/24 at 6:14 PM. Powered by [employer].  "

## 2024-01-03 NOTE — PATIENT INSTRUCTIONS
Thyroid blood work is normal.     The thyroid exam shows no enlargement of the nodules.     We'll recheck ultrasound next year.     Follow up in 1 year with blood work and thyroid ultrasound.

## 2024-12-02 ENCOUNTER — HOSPITAL ENCOUNTER (OUTPATIENT)
Dept: ULTRASOUND IMAGING | Facility: HOSPITAL | Age: 69
Discharge: HOME | End: 2024-12-02
Payer: MEDICARE

## 2024-12-02 ENCOUNTER — APPOINTMENT (OUTPATIENT)
Dept: LAB | Facility: HOSPITAL | Age: 69
End: 2024-12-02
Attending: INTERNAL MEDICINE
Payer: MEDICARE

## 2024-12-02 DIAGNOSIS — E04.1 THYROID NODULE: ICD-10-CM

## 2024-12-02 DIAGNOSIS — E04.2 GOITER, NONTOXIC, MULTINODULAR: ICD-10-CM

## 2024-12-02 LAB
T4 FREE SERPL-MCNC: 0.85 NG/DL (ref 0.61–1.12)
TSH SERPL DL<=0.05 MIU/L-ACNC: 1.36 UIU/ML (ref 0.45–4.5)

## 2024-12-02 PROCEDURE — 36415 COLL VENOUS BLD VENIPUNCTURE: CPT

## 2024-12-02 PROCEDURE — 84443 ASSAY THYROID STIM HORMONE: CPT

## 2024-12-02 PROCEDURE — 76536 US EXAM OF HEAD AND NECK: CPT

## 2024-12-02 PROCEDURE — 84439 ASSAY OF FREE THYROXINE: CPT

## 2024-12-06 ENCOUNTER — RESULTS FOLLOW-UP (OUTPATIENT)
Dept: ENDOCRINOLOGY | Facility: HOSPITAL | Age: 69
End: 2024-12-06

## 2025-03-13 ENCOUNTER — OFFICE VISIT (OUTPATIENT)
Dept: ENDOCRINOLOGY | Facility: HOSPITAL | Age: 70
End: 2025-03-13
Payer: COMMERCIAL

## 2025-03-13 VITALS
HEART RATE: 114 BPM | SYSTOLIC BLOOD PRESSURE: 158 MMHG | WEIGHT: 157.2 LBS | DIASTOLIC BLOOD PRESSURE: 78 MMHG | BODY MASS INDEX: 26.84 KG/M2 | HEIGHT: 64 IN

## 2025-03-13 DIAGNOSIS — E04.2 GOITER, NONTOXIC, MULTINODULAR: Primary | ICD-10-CM

## 2025-03-13 PROCEDURE — 99214 OFFICE O/P EST MOD 30 MIN: CPT | Performed by: INTERNAL MEDICINE

## 2025-03-13 PROCEDURE — G2211 COMPLEX E/M VISIT ADD ON: HCPCS | Performed by: INTERNAL MEDICINE

## 2025-03-13 NOTE — PATIENT INSTRUCTIONS
The thyroid blood work is normal.     Physical exam shows no growing nodules.    Thyroid ultrasound showed no change in thyroid nodule sizes. Recheck ultrasound in 2 years.     Follow up in 1 year with blood work.    Discuss with diabetes person the possibility of changing to omnipod 5 with freestyle dc 2 plus.  They would be connected.

## 2025-03-13 NOTE — PROGRESS NOTES
3/16/2025    Assessment & Plan      Diagnoses and all orders for this visit:    Goiter, nontoxic, multinodular  -     T4, free; Future  -     TSH, 3rd generation; Future  -     T4, free  -     TSH, 3rd generation        Assessment & Plan  1. Nontoxic multinodular goiter.  Her thyroid function tests remain within normal limits. The physical examination did not reveal any palpable nodules, and the most recent ultrasound showed no change in nodule size. A follow-up ultrasound is scheduled for 2026.    2. Diabetes mellitus.  She reports occasional blood sugar spikes up to 275 mg/dL and lows down to 90 mg/dL. She is currently using the OmniPod insulin pump and Halley sensor, which are not integrated. She is advised to discuss with her diabetes care provider the possibility of upgrading to the OmniPod 5 and Freestyle Halley 2+ system, which can communicate and adjust insulin delivery based on glucose levels.    3. Hypertension.  Her blood pressure was noted to be slightly elevated today, though it was higher at 170 mmHg last week. She experiences palpitations when nervous, despite being on blood pressure medication.  She will be following up with her primary care physician regarding this problem.    I have asked her to follow-up in 1 year with preceding TSH and free T4.        CC: Thyroid nodule/goiter follow-up    History of Present Illness    HPI: Rose Mary Parekh is a 69-year-old female with a history of nontoxic multinodular goiter, here for a follow-up visit.    She had thyroid nodules noted over 14 years ago on routine physical exam by her primary care physician. The nodules have been subcentimeter in size or just above 1 cm, so no biopsy has been needed based on size and characteristics. An ultrasound in 2020 showed several TR4 thyroid nodules, not over 1.5 cm, and an ultrasound in 2021 showed a TR4 1.7 cm right upper pole nodule. This was eventually biopsied, demonstrating a [place] class IV lesion with benign Afirma  testing. A subsequent ultrasound in 2022 showed no change in nodule sizes.     She reports feeling well overall, with no significant changes in her health status this year. She does not experience extreme temperature sensitivity, either hot or cold, but notes that she resides in a basement and occasionally uses an infrared heater when the environment becomes too damp. She experiences palpitations, particularly when anxious, despite being on antihypertensive medication. Her blood pressure was elevated at 170 last week, but she attributes this to nervousness and reports a decrease in her anxiety levels this week. She also reports experiencing tremors and dry skin but does not have brittle nails or hair loss. She occasionally experiences both diarrhea and constipation, which she believes may be side effects of her antihypertensive medication. She recalls a recent episode of frequent bathroom visits throughout an afternoon. Her sleep pattern is generally good, although she woke up at 2:30 AM on the day of the visit. She does not feel excessively fatigued and reports no symptoms of anxiety or depression. She does not have dysphagia or food impaction. Her weight has remained stable since last year.    She is currently using an OmniPod insulin pump and is under the care of Dr. Donovan for her diabetes management. She reports occasional hyperglycemia, with blood glucose levels reaching 200s or 275, despite not consuming large amounts of food. She administers insulin via the pump and practices carbohydrate counting. She experienced a hypoglycemic episode yesterday, with a blood glucose level of 90. Her current blood glucose level is 213, following the consumption of an egg sandwich on whole wheat bread prior to the visit. She is using the Halley 2+ sensor, which is not integrated with her insulin pump, but she is expecting a shipment of Halley 3 sensors. She has monthly consultations with a diabetes educator. She also reports  occasional skin irritation from the adhesive used for her insulin pump.        Historical Information   Past Medical History:   Diagnosis Date    Family history of breast cancer     Hyperlipidemia     Hypertension     Type 2 diabetes mellitus (HCC)      Past Surgical History:   Procedure Laterality Date    BREAST LUMPECTOMY Left     reprtedly noncancerous    KIDNEY STONE SURGERY Right     lasar treatment    KNEE ARTHROTOMY Right     rebuilt knee cap    TONSILLECTOMY AND ADENOIDECTOMY      TOTAL ABDOMINAL HYSTERECTOMY W/ BILATERAL SALPINGOOPHORECTOMY      TUBAL LIGATION Bilateral     US GUIDED THYROID BIOPSY  11/16/2021     Social History   Social History     Substance and Sexual Activity   Alcohol Use No     Social History     Substance and Sexual Activity   Drug Use No     Social History     Tobacco Use   Smoking Status Never   Smokeless Tobacco Never     Family History:   Family History   Problem Relation Age of Onset    Hypertension Mother     Dementia Mother     Lung cancer Father         metastatic to brain    Diabetes type II Sister     Breast cancer Sister     Diabetes type II Brother     No Known Problems Brother     No Known Problems Daughter     No Known Problems Son        Meds/Allergies   Current Outpatient Medications   Medication Sig Dispense Refill    atorvastatin (LIPITOR) 20 mg tablet Take 20 mg by mouth daily      hydrochlorothiazide (HYDRODIURIL) 25 mg tablet Take by mouth daily      insulin lispro (HumaLOG) 100 units/mL injection Inject under the skin USE VIA OMNIPOD      losartan (COZAAR) 50 mg tablet Take 50 mg by mouth daily      MISC NATURAL PRODUCTS PO Take by mouth Superbeets      Multiple Vitamin (MULTI-VITAMIN DAILY PO) Take by mouth      Omega-3 Fatty Acids (OMEGA 3 500 PO) Take by mouth (Patient not taking: Reported on 3/13/2025)       No current facility-administered medications for this visit.     Allergies   Allergen Reactions    Contrast [Iodinated Contrast Media] Other (See  "Comments)     Passed out.     Statins Diarrhea       Objective   Vitals: Blood pressure 158/78, pulse (!) 114, height 5' 4\" (1.626 m), weight 71.3 kg (157 lb 3.2 oz).  Invasive Devices       None                   Physical Exam    No lid lag, stare, proptosis or periorbital edema in the eyes.  Thyroid remains irregular and firm without discrete nodules palpable. No lymphadenopathy in the neck.  Lungs are clear to auscultation.  Heart has a regular rate and rhythm. No murmurs detected.  No tremor of the outstretched hands. Patellar deep tendon reflex is normal.      The history was obtained from the review of the chart and from the patient.    Lab Results:      Blood work performed on 12/2/2024 showed a TSH of 1.362 with a free T4 of 0.85.    Lab Results   Component Value Date    CREATININE 0.93 03/19/2024    CREATININE 0.85 11/30/2022    BUN 14 03/19/2024    K 4.2 03/19/2024    CO2 24.2 03/19/2024     GFR, Calculated   Date Value Ref Range Status   03/19/2024 66.3 >60.0 mL/min/1.73m*2 Final     EGFR   Date Value Ref Range Status   03/19/2024 59.6  Final         Lab Results   Component Value Date    ALT 26 03/19/2024    AST 19 03/19/2024    ALKPHOS 94 03/19/2024       Lab Results   Component Value Date    TSH 1.40 10/10/2020    FREET4 0.85 12/02/2024       Thyroid ultrasound:    THYROID ULTRASOUND performed on 12/2/2024     INDICATION: E04.2: Nontoxic multinodular goiter  E04.1: Nontoxic single thyroid nodule.     COMPARISON: Thyroid ultrasound 11/1/2022, biopsy 11/16/2021     TECHNIQUE: Ultrasound of the thyroid was performed with a high frequency linear transducer in transverse and sagittal planes including volumetric imaging sweeps as well as traditional still imaging technique.     FINDINGS:  Thyroid texture: Normal homogeneous smooth echotexture.     Right lobe: 5.4 x 2.1 x 1.8 cm. Volume 9.5 mL  Left lobe: 5.2 x 1.5 x 1.3 cm. Volume 4.9 mL  Isthmus: 0.6 cm.     Nodule #1. Image 20.  RIGHT upper pole nodule " measuring 1.7 x 1.2 x 1.3 cm. Unchanged from prior.  COMPOSITION: 2 points, solid or almost completely solid.  ECHOGENICITY: 1 point, hyperechoic or isoechoic.  SHAPE: 0 points, wider-than-tall.  MARGIN: 0 points, smooth.  ECHOGENIC FOCI: 0 points, none or large comet-tail artifacts.  TI-RADS Classification: TR 3 (3 points). FNA if >/= 2.5 cm. Follow if >/= 1.5 cm. This nodule has had a previous benign biopsy ([place] Category IV, Afirma GSC Benign). As it is stable, no further sampling recommended at this time. Further surveillance   at 2 year intervals could be considered to confirm continued stability for a period of greater than 5 years.     Nodule #2. Image 54.  LEFT upper pole nodule measuring 1.1 x 0.7 x 0.8 cm. Unchanged from prior.  COMPOSITION: 2 points, solid or almost completely solid.  ECHOGENICITY: 2 points, hypoechoic.  SHAPE: 0 points, wider-than-tall.  MARGIN: 0 points, smooth.  ECHOGENIC FOCI: 0 points, none or large comet-tail artifacts.  TI-RADS Classification: TR 4 (4-6 points). FNA if >/= 1.5 cm. Follow if >/= 1 cm.     There are additional nodules of lesser size and/or TI-RADS score which do not warrant further delineation based on ACR parameters.        IMPRESSION:     Unchanged thyroid nodules including previously biopsied right upper pole nodule. As it is stable, no further sampling recommended at this time. Further surveillance at 2 year intervals could be considered to confirm continued stability for a period of   greater than 5 years.      Future Appointments   Date Time Provider Department Center   3/23/2026 11:40 AM Ana María Sheffield MD [facility]